# Patient Record
Sex: FEMALE | Race: WHITE | NOT HISPANIC OR LATINO | URBAN - METROPOLITAN AREA
[De-identification: names, ages, dates, MRNs, and addresses within clinical notes are randomized per-mention and may not be internally consistent; named-entity substitution may affect disease eponyms.]

---

## 2017-04-12 ENCOUNTER — EMERGENCY (EMERGENCY)
Facility: HOSPITAL | Age: 32
LOS: 1 days | Discharge: HOME | End: 2017-04-12
Attending: EMERGENCY MEDICINE | Admitting: EMERGENCY MEDICINE
Payer: COMMERCIAL

## 2017-04-12 VITALS
HEART RATE: 84 BPM | OXYGEN SATURATION: 97 % | DIASTOLIC BLOOD PRESSURE: 79 MMHG | RESPIRATION RATE: 18 BRPM | SYSTOLIC BLOOD PRESSURE: 116 MMHG | WEIGHT: 115.08 LBS

## 2017-04-12 DIAGNOSIS — Z88.0 ALLERGY STATUS TO PENICILLIN: ICD-10-CM

## 2017-04-12 DIAGNOSIS — R07.81 PLEURODYNIA: ICD-10-CM

## 2017-04-12 DIAGNOSIS — Z88.1 ALLERGY STATUS TO OTHER ANTIBIOTIC AGENTS STATUS: ICD-10-CM

## 2017-04-12 PROCEDURE — 71020: CPT | Mod: 26

## 2017-04-12 PROCEDURE — 99283 EMERGENCY DEPT VISIT LOW MDM: CPT | Mod: 25

## 2017-04-12 PROCEDURE — 71046 X-RAY EXAM CHEST 2 VIEWS: CPT

## 2017-04-12 PROCEDURE — 99284 EMERGENCY DEPT VISIT MOD MDM: CPT | Mod: 25

## 2017-04-12 NOTE — ED ADULT TRIAGE NOTE - CHIEF COMPLAINT QUOTE
c/o pain to chloé lower ribs since 8 pm, hurts to breath, denies injury, cough c/o pain to chloé lower ribs since 8 pm, hurts to breath, denies injury, cough, pt added nausea in triage

## 2017-04-12 NOTE — ED ADULT NURSE NOTE - CHIEF COMPLAINT QUOTE
c/o pain to chloé lower ribs since 8 pm, hurts to breath, denies injury, cough, pt added nausea in triage

## 2017-04-12 NOTE — ED PROVIDER NOTE - MEDICAL DECISION MAKING DETAILS
pt w/atraumatic b/l rib soreness x few hrs, took advil w/symptomatic relief, pain reproducible, cxr clear, pe unlikely - no risk factors and no tachycardia/tachypnea/hypoxia, to con't prn ibuprofen, f/u w/pmd, ? muscular vs beginnings of uri given earlier chills/malaise. pt understands and agrees w/plan

## 2017-04-12 NOTE — ED PROVIDER NOTE - RESPIRATORY, MLM
Breath sounds clear and equal bilaterally.  no rales, no rhonchi, no wheezes b/l. no rash. + discomfort over lower rib cage b/l

## 2017-04-12 NOTE — ED PROVIDER NOTE - OBJECTIVE STATEMENT
The pt is a 30 y/o F, who presents to ED stating lower rib soreness x few hrs. pt states that was feeling weak and fatigued before w/some chills, felt like was coming down w/a cold, but symptoms passed, then developed b/l lower rib soreness - 5/10, constant and dull, aggravated w/mov and breathing, took advil w/relief. Denies sob, palpitations, dyspnea, cough, n/v, abd pain, leg pain or swelling, fever

## 2017-04-12 NOTE — ED ADULT NURSE NOTE - OBJECTIVE STATEMENT
pt received in room 4 A & o x 3 pt c/o bilateral rib pain , denies any SOB , denies any injury , lungs are clear will continue to monitor

## 2025-07-26 NOTE — ED PROVIDER NOTE - CONDUCTED A DETAILED DISCUSSION WITH PATIENT AND/OR GUARDIAN REGARDING, MDM
Presents to ER after trip and fall at cabin in [place], then drove 1 hour and 20 minutes to get to hospital. Reported that pt fell backwards and log rolled several times. Pt c/o right ankle pain and has bilateral abrasions to knees  Pt taking Eliquis  Denies LOC but unsure if she hit head  Pt reports taking 2 tylenol prior to arrival   Triage Assessment (Adult)       Row Name 07/26/25 [id number]          Triage Assessment    Airway WDL WDL        Respiratory WDL    Respiratory WDL WDL        Skin Circulation/Temperature WDL    Skin Circulation/Temperature WDL X  abrasion bilateral knees        Cardiac WDL    Cardiac WDL WDL        Peripheral/Neurovascular WDL    Peripheral Neurovascular WDL WDL        Cognitive/Neuro/Behavioral WDL    Cognitive/Neuro/Behavioral WDL WDL            need for outpatient follow-up/return to ED if symptoms worsen, persist or questions arise/radiology results